# Patient Record
Sex: MALE
[De-identification: names, ages, dates, MRNs, and addresses within clinical notes are randomized per-mention and may not be internally consistent; named-entity substitution may affect disease eponyms.]

---

## 2021-03-01 ENCOUNTER — HOSPITAL ENCOUNTER (OUTPATIENT)
Dept: HOSPITAL 82 - ED | Age: 61
Setting detail: OBSERVATION
LOS: 3 days | Discharge: HOME | DRG: 177 | End: 2021-03-04
Attending: INTERNAL MEDICINE | Admitting: INTERNAL MEDICINE
Payer: COMMERCIAL

## 2021-03-01 VITALS — HEIGHT: 67 IN | WEIGHT: 209.25 LBS | BODY MASS INDEX: 32.84 KG/M2

## 2021-03-01 VITALS — SYSTOLIC BLOOD PRESSURE: 116 MMHG | DIASTOLIC BLOOD PRESSURE: 78 MMHG

## 2021-03-01 VITALS — DIASTOLIC BLOOD PRESSURE: 85 MMHG | SYSTOLIC BLOOD PRESSURE: 131 MMHG

## 2021-03-01 DIAGNOSIS — Z95.5: ICD-10-CM

## 2021-03-01 DIAGNOSIS — K21.9: ICD-10-CM

## 2021-03-01 DIAGNOSIS — E78.5: ICD-10-CM

## 2021-03-01 DIAGNOSIS — I10: ICD-10-CM

## 2021-03-01 DIAGNOSIS — R09.02: ICD-10-CM

## 2021-03-01 DIAGNOSIS — I25.10: ICD-10-CM

## 2021-03-01 DIAGNOSIS — U07.1: Primary | ICD-10-CM

## 2021-03-01 DIAGNOSIS — J12.82: ICD-10-CM

## 2021-03-01 LAB
ALBUMIN SERPL-MCNC: 4 G/DL (ref 3.2–5)
ALP SERPL-CCNC: 91 U/L (ref 38–126)
ANION GAP SERPL CALCULATED.3IONS-SCNC: 15 MMOL/L
AST SERPL-CCNC: 49 U/L (ref 19–48)
BASOPHILS NFR BLD AUTO: 1 % (ref 0–3)
BUN SERPL-MCNC: 19 MG/DL (ref 8–23)
BUN/CREAT SERPL: 26
CHLORIDE SERPL-SCNC: 95 MMOL/L (ref 95–108)
CO2 SERPL-SCNC: 25 MMOL/L (ref 22–30)
CREAT SERPL-MCNC: 0.8 MG/DL (ref 0.7–1.3)
EOSINOPHIL NFR BLD AUTO: 2 % (ref 0–8)
ERYTHROCYTE [DISTWIDTH] IN BLOOD BY AUTOMATED COUNT: 12.2 % (ref 11.5–15.5)
HCT VFR BLD AUTO: 47.3 % (ref 39–50)
HGB BLD-MCNC: 16.1 G/DL (ref 14–18)
IMM GRANULOCYTES NFR BLD: 4.1 % (ref 0–5)
LYMPHOCYTES NFR BLD: 14 % (ref 15–41)
MCH RBC QN AUTO: 30.1 PG  CALC (ref 26–32)
MCHC RBC AUTO-ENTMCNC: 34 G/DL CAL (ref 32–36)
MCV RBC AUTO: 88.4 FL  CALC (ref 80–100)
MONOCYTES NFR BLD AUTO: 10 % (ref 2–13)
NEUTROPHILS # BLD AUTO: 7.62 THOU/UL (ref 1.82–7.42)
NEUTROPHILS NFR BLD AUTO: 69 % (ref 42–76)
PLATELET # BLD AUTO: 404 THOU/UL (ref 130–400)
POTASSIUM SERPL-SCNC: 3.8 MMOL/L (ref 3.5–5.1)
PROT SERPL-MCNC: 8 G/DL (ref 6.3–8.2)
RBC # BLD AUTO: 5.35 MILL/UL (ref 4.7–6.1)
SODIUM SERPL-SCNC: 131 MMOL/L (ref 137–146)

## 2021-03-01 PROCEDURE — G0378 HOSPITAL OBSERVATION PER HR: HCPCS

## 2021-03-01 NOTE — NUR
PT ARRIVED TO [facility] FLOOR ROOM #289 VIA STRETCHER IN STABLE CONDITION
ACCOMPANIED BY NURSEJENNIFER;VS AND ASSESSMENT WERE COMPLETED;VS WITHIN
NORMAL LIMITS;ALLERGY BAND PLACED ON PT;HEART SOUNDS REGULAR IN RATE AND
RHYTHM;LUNG SOUNDS WERE CLEAR THROUGHOUT AND DIMINSHED IN LOWER
LOBES;RESPIRATIONS WERE EVEN AND UNLABORED ON RA;NON-PRODUCTIVE COUGH NOTED;PT
HAS NO COMPLAINTS OF PAIN OTHER THAN WHEN HE COUGHS, HIS CHEST AND HEAD WILL
HURT;NO EDEMA PRESENT;SKIN IS INTACT;TELE IN PLACE;#20G IV IN LAC IS RUNNING
NS @100 ML/HR;IV SITE IS PATENT AND FREE OF COMPLICATIONS;SAFETY PRECAUTIONS
IN PLACE;CALL LIGHT WITHIN REACH;BED IN LOWEST POSITION;WILL CONTINUE TO
MONITOR.

## 2021-03-01 NOTE — NUR
PATIENT RESTING IN BED AT THIS TIME AWAKE ALERT AND ORIENTEDX3. PATIENT ON
ISOLATION IN NEG PRESSURE ROOM FOR COVID. IVF NS PATENT AND INFUSING VIA LAC
SITE AT 100CC/HR. SITE IS HEALTHY AT THIS TIME. TELE MONITOR IN PLACE. O2 VIA
NASAL CANNULA APPLIED. O2 SAT ON RA IS 92%. LUNGS ARE CLLEAR AND DIMINISHED IN
BASES. ENCOURAGED U SE OF IS Q1H WHILE AWAKE IN REPS OF 10. ENCOURAGED PRONING
IF POSSIBLE. PATIENT STATES LAST BM WAS TODAY-NO DIARRHEA. DENIES ANY
DIFFICULTY WITH URINATION. MEDICATED FOR DRY COUGH WITH ROBITUSSIN AC 10CC AS
ORDERED. SAFETY PRECAUTIONS REINFORCED. CALL LIGHT IN REACH. WILL CONT TO
MONITOR.

## 2021-03-02 VITALS — DIASTOLIC BLOOD PRESSURE: 89 MMHG | SYSTOLIC BLOOD PRESSURE: 137 MMHG

## 2021-03-02 VITALS — DIASTOLIC BLOOD PRESSURE: 86 MMHG | SYSTOLIC BLOOD PRESSURE: 154 MMHG

## 2021-03-02 VITALS — SYSTOLIC BLOOD PRESSURE: 119 MMHG | DIASTOLIC BLOOD PRESSURE: 77 MMHG

## 2021-03-02 VITALS — SYSTOLIC BLOOD PRESSURE: 123 MMHG | DIASTOLIC BLOOD PRESSURE: 74 MMHG

## 2021-03-02 VITALS — SYSTOLIC BLOOD PRESSURE: 142 MMHG | DIASTOLIC BLOOD PRESSURE: 88 MMHG

## 2021-03-02 VITALS — DIASTOLIC BLOOD PRESSURE: 81 MMHG | SYSTOLIC BLOOD PRESSURE: 147 MMHG

## 2021-03-02 LAB
ALBUMIN SERPL-MCNC: 3.4 G/DL (ref 3.2–5)
ALP SERPL-CCNC: 70 U/L (ref 38–126)
ANION GAP SERPL CALCULATED.3IONS-SCNC: 15 MMOL/L
AST SERPL-CCNC: 33 U/L (ref 19–48)
BASOPHILS NFR BLD AUTO: 1 % (ref 0–3)
BUN SERPL-MCNC: 21 MG/DL (ref 8–23)
BUN/CREAT SERPL: 33
CHLORIDE SERPL-SCNC: 102 MMOL/L (ref 95–108)
CO2 SERPL-SCNC: 23 MMOL/L (ref 22–30)
CREAT SERPL-MCNC: 0.6 MG/DL (ref 0.7–1.3)
CRP SERPL-MCNC: 6.8 MG/DL (ref 0–0.9)
EOSINOPHIL NFR BLD AUTO: 0 % (ref 0–8)
ERYTHROCYTE [DISTWIDTH] IN BLOOD BY AUTOMATED COUNT: 12.2 % (ref 11.5–15.5)
HCT VFR BLD AUTO: 47.5 % (ref 39–50)
HGB BLD-MCNC: 15.6 G/DL (ref 14–18)
IMM GRANULOCYTES NFR BLD: 3.7 % (ref 0–5)
LYMPHOCYTES NFR BLD: 14 % (ref 15–41)
MCH RBC QN AUTO: 29.8 PG  CALC (ref 26–32)
MCHC RBC AUTO-ENTMCNC: 32.8 G/DL CAL (ref 32–36)
MCV RBC AUTO: 90.8 FL  CALC (ref 80–100)
MONOCYTES NFR BLD AUTO: 4 % (ref 2–13)
NEUTROPHILS # BLD AUTO: 4.48 THOU/UL (ref 1.82–7.42)
NEUTROPHILS NFR BLD AUTO: 78 % (ref 42–76)
PLATELET # BLD AUTO: 334 THOU/UL (ref 130–400)
POTASSIUM SERPL-SCNC: 5.6 MMOL/L (ref 3.5–5.1)
PROT SERPL-MCNC: 6.9 G/DL (ref 6.3–8.2)
RBC # BLD AUTO: 5.23 MILL/UL (ref 4.7–6.1)
SODIUM SERPL-SCNC: 134 MMOL/L (ref 137–146)

## 2021-03-02 NOTE — NUR
PATIENT SITTING UP IN THE RECLINER WITH PERSISTANT DRY HACKING COUGH. O2 SATS
ARE IN THE MID-HIGH 80'S. O2 REAPPLIED VIA NASAL CANNULA AT 3LPM. RESPONDED
WELL TO LOW NINETY'S. MEDICATED WITH ROBITUSSIN 10CC AS ORDERED. SAFETY
PRECAUTIONS REINFORCED. CALL LIGHT IN REACH. WILL CONT TO MONITOR.

## 2021-03-02 NOTE — NUR
PATIENT RESTING IN BED WATCHING TV WITH O2 VIA NASAL CANNULA IN PLACE AT 2LPM.
O2 SAT IS 95% AT THIS TIME. AWAKE ALERT AND ORIENTEDX3. PATIENT ON ISOLATION
IN NEG PRESSURE ROOM FOR COVID. TELE MONITOR IN PLACE. PATIENT ENCOURAGED USE
OF IS Q1H W/A IN REPS OF 10 AND ABLE TO DEMONSTRATE PROPER USE OF THE DEVICE.
LUNGS ARE DIMINISHED BUT CLEAR. NON-PRODUCTIVE COUGH REMAINS-MEDICATED WITH
ROBITUSSIN AC 10CC AS ORDERED FOR COUGH. ENCOURAGED PRONING IF POSSIBLE. IV
SITE TO LAC INTACT WITH IVF NS PATENT AND INFUSING AT KVO RATE. SITE REMAINS
HEALTHY. SAFETY PRECAUTIONS REINFORCED. CALL LIGHT IN REACH. WILL CONT TO
MONITOR.

## 2021-03-02 NOTE — NUR
PT WAS FOUND SITTING IN BED EATING LUNCH;PT HAS NO REPORTS OF PAIN AT THIS
TIME;O2 VIA NC @2L IS IN PLACE;TELE IS IN PLACE;#20G IV IN LAC IS RUNNING NS
@10ML/HR;IV SITE IS FREE OF COMPLICATIONS;SAFETY PRECAUTIONS IN PLACE;CALL
LIGHT WIHIN REACH; BED IN LOWEST POSITION;WILL CONTINUE TO MONITOR.

## 2021-03-02 NOTE — NUR
PATIENT RESTING IN BED AT THIS TIME WITH O2 VIA NASAL CANNULA IN PLACE. O2 SAT
IS NOW 97% WITH O2 ON. TELE IS SR-83 AT THIS TIME. IVF NS PATENT AND INFUSING
VIA LAC SITE AT 100CC/HR. STILL WITH NON-PRODUCTIVE EVEN AFTER ROBITUSSIN AC
GIVEN. SAFETY PRECAUTIONS REINFORCED. CALL LIGHT IN REACH. WILL CONT TO
MONITOR,.

## 2021-03-02 NOTE — NUR
PATIENT RESTING IN BED-CONT TO HAVE DRY HACKING COUGH-MEDICATED WITH
ROBITUSSIN AC 10CC FOR COUGH. O2 REMAINS IN PLACE AT 2LPM. IVF PATENT AND
INFUSING VIA LEFT AC SITE AT 100CC/HR. TELE MONITOR IN PLACE. INSTRUCTED ON U
SE OF IS Q1H WHILE AWAKE IN REPS OF 10-ABLE TO DEMONSTRATE PROPER USE OF THE
DEVICE. SAFETY PRECAUTIONS REINFORCED. CALL LIGHT IN REACH. WILL CONT TO
MONITOR.

## 2021-03-02 NOTE — NUR
PT WAS FOUND RESTING IN BED IN SEMI-FOWLERS POSITION;PT IS A&OX3;VS AND
ASSESSMENT WERE COMPLETED;VS WERE WITHIN NORMAL LIMITS WITH THE EXCEPTION OF
;MORNING MEDICATIONS WERE GIVEN AT THIS TIME;PT REPORTS NO PAIN;HEART
SOUNDS ARE REGULAR IN RATE AND RHYTHM;LUNG SOUNDS ARE CLEAR AND DIMINSHED IN
LOWER LOBES;RESPIRATIONS ARE EVEN AND UNLABORED ON 2L O2 VIA NC WITH SAT OF
96%;PT HAS A NON-PRODUCTIVE COUGH NOTED;TELE IS IN PLACE READING SR
@65BPM;#20G IV IN LAC RUNNING NS @100ML/HR;IV SITE IS FREE OF
COMPLICATIONS;SAFETY PRECAUTIONS IN PLACE;CALL LIGHT WITHIN REACH;BED IN
LOWEST POSITON;WILL CONTINUE TO MONITOR.

## 2021-03-02 NOTE — NUR
PT WAS FOUND RESTING IN BED IN SEMI-FOWLERS POSITION;PT REPORTS NO PAIN;TELE
IS IN PLACE;O2 VIA NC @2L IS IN PLACE;#20G IV IN LAC IS RUNNING NS@10ML/HR;IV
SITE IS FREE OF COMPLICATIONS;SAFETY PRECAUTIONS IN PLACE;CALL LIGHT WITHIN
REACH;BED IN LOWEST POSITION;WILL CONTINUE TO MONITOR.

## 2021-03-03 VITALS — DIASTOLIC BLOOD PRESSURE: 88 MMHG | SYSTOLIC BLOOD PRESSURE: 152 MMHG

## 2021-03-03 VITALS — SYSTOLIC BLOOD PRESSURE: 154 MMHG | DIASTOLIC BLOOD PRESSURE: 90 MMHG

## 2021-03-03 VITALS — SYSTOLIC BLOOD PRESSURE: 146 MMHG | DIASTOLIC BLOOD PRESSURE: 87 MMHG

## 2021-03-03 VITALS — DIASTOLIC BLOOD PRESSURE: 82 MMHG | SYSTOLIC BLOOD PRESSURE: 154 MMHG

## 2021-03-03 VITALS — SYSTOLIC BLOOD PRESSURE: 154 MMHG | DIASTOLIC BLOOD PRESSURE: 85 MMHG

## 2021-03-03 VITALS — SYSTOLIC BLOOD PRESSURE: 156 MMHG | DIASTOLIC BLOOD PRESSURE: 83 MMHG

## 2021-03-03 LAB
ALBUMIN SERPL-MCNC: 3.2 G/DL (ref 3.2–5)
ALP SERPL-CCNC: 66 U/L (ref 38–126)
ANION GAP SERPL CALCULATED.3IONS-SCNC: 14 MMOL/L
AST SERPL-CCNC: 24 U/L (ref 19–48)
BASOPHILS NFR BLD AUTO: 0 % (ref 0–3)
BUN SERPL-MCNC: 19 MG/DL (ref 8–23)
BUN/CREAT SERPL: 30
CHLORIDE SERPL-SCNC: 102 MMOL/L (ref 95–108)
CO2 SERPL-SCNC: 26 MMOL/L (ref 22–30)
CREAT SERPL-MCNC: 0.6 MG/DL (ref 0.7–1.3)
CRP SERPL-MCNC: 3.8 MG/DL (ref 0–0.9)
EOSINOPHIL NFR BLD AUTO: 0 % (ref 0–8)
ERYTHROCYTE [DISTWIDTH] IN BLOOD BY AUTOMATED COUNT: 12.1 % (ref 11.5–15.5)
HCT VFR BLD AUTO: 42.6 % (ref 39–50)
HGB BLD-MCNC: 14.2 G/DL (ref 14–18)
IMM GRANULOCYTES NFR BLD: 1.5 % (ref 0–5)
LYMPHOCYTES NFR BLD: 7 % (ref 15–41)
MCH RBC QN AUTO: 30.1 PG  CALC (ref 26–32)
MCHC RBC AUTO-ENTMCNC: 33.3 G/DL CAL (ref 32–36)
MCV RBC AUTO: 90.4 FL  CALC (ref 80–100)
MONOCYTES NFR BLD AUTO: 7 % (ref 2–13)
NEUTROPHILS # BLD AUTO: 11.45 THOU/UL (ref 1.82–7.42)
NEUTROPHILS NFR BLD AUTO: 84 % (ref 42–76)
PLATELET # BLD AUTO: 403 THOU/UL (ref 130–400)
POTASSIUM SERPL-SCNC: 5.3 MMOL/L (ref 3.5–5.1)
PROT SERPL-MCNC: 6.6 G/DL (ref 6.3–8.2)
RBC # BLD AUTO: 4.71 MILL/UL (ref 4.7–6.1)
SODIUM SERPL-SCNC: 137 MMOL/L (ref 137–146)

## 2021-03-03 NOTE — NUR
PATIENT RESTING IN BED WITH O2 VIA NASAL CANNULA IN PLACE AT 2LPM. EYES ARE
CLOSED AND RESPS ARE EVEN AND UNLABORED. TELE MONITOR IN PLACE. IVF PATENT AND
INFUSING AT KVO RATE VIA LAC SITE. LESS COUGHING TONIGHT COMPARED TO LAST
NIGHT. CALL LIGHT IN REACH. WILL CONT TO MONITOR.

## 2021-03-03 NOTE — NUR
PT RESTING IN SEMI FOWLERS POSITION,A&O X3;PT DENIES ANY CURRENT PAIN OR
DISCOMFORTS,PAIN SCALE AND REPORTING EDUCATED;RESPIRATIONS EVEN AND UNLABORED
ON O2 @ 2L VIA NC-PT IS NOT OXYGEN DEPENDENT;CLEAR/DIMINISHED LUNG SOUNDS
NOTED WITH NON-PRODUCTIVE COUGH;ABDOMAN SOFT ON PALPATION AND ACTIVE IN ALL 4
QUADRANTS;STRONG PEDAL PULSES;SKIN INTACT;TELE MONITORING IN PLACE;#20G TO LAC
INFUSING NS @ 10ML/HR,SITE APPEARS HEALTHY;PT REMAINS IN AIR/CONTACT
PRECAUTIONS DUE TO COVID19 DX;PT DENIES ANY ADDITIONAL NEEDS AT THIS
TIME;ENCOURAGED TO CALL FOR ASSISTANCE IF NEEDED;FALL PRECAUTIONS IN PLACE
WITH BED IN THE LWOEST POSITION AND CALL LIGHT IN REACH;WILL CONTINUE TO
MONITOR

## 2021-03-03 NOTE — NUR
PT RESTING IN SEMI FOWLERS POSITION;RESPIRATIONS EVEN AND UNLABORED ON O2 @ 1L
VIA NC, OXYGEN WAS TITRATED DOWN BY MD ON ROUNDS;O2 REMOVED AT THIS TIME;PT
DENIES ANY CURRENT PAIN OR DISCOMFORTS;PT REQUEST PRN COUGH MEDICATION, PT TO
BE MEDICATED WITH PRN ROBITUSSIN AC PER REQUEST;TELE MONITORING IN PLACE;NS
INFUSING WITH EASE TO LAC PER ORDER;PT DENIES ANY ADDITIONAL NEEDS AND IS
ENCOURAGED TO CALL FOR ASSISTANCE IF NEEDED;CALL LIGHT IN REACH;WILL CONTINUE
TO MONITOR

## 2021-03-03 NOTE — NUR
PT RESTING IN SEMI FOWLERS POSITION WATCHING TV;RESPIRATIONS EVEN AND
UNLABORED ON RA;PT DENIES ANY CURRENT PAIN OR DISCOMFORTS;TELE MONITORING IN
PLACE;IV SITE TO LAC PATENT INFUSING NS WITH EASE PER ORDER;PT DENIES ANY
ADDITIONAL NEEDS AT THIS TIME;ENCOURAGED TO CALL FOR ASSISTANCE IF NEEDED;CALL
LIGHT IN REACH;WILL CONTINUE TO MONITOR

## 2021-03-03 NOTE — NUR
PATIENT RESTING IN BED WITH O2 VIA NASAL CANNULA IN PLACE AT 2LPM. O2 SAT IS
95%. EYES ARE CLOSED. RESPS ARE EVEN AND UNLABORED. TELE MONITOR IN PLACE. IVF
PATENT AND INFUSING VIA LAC SITE AT KVO RATE. CALL LIGHT IN REACH. WILL CONT
TO MONITOR.

## 2021-03-04 VITALS — DIASTOLIC BLOOD PRESSURE: 91 MMHG | SYSTOLIC BLOOD PRESSURE: 151 MMHG

## 2021-03-04 VITALS — DIASTOLIC BLOOD PRESSURE: 86 MMHG | SYSTOLIC BLOOD PRESSURE: 164 MMHG

## 2021-03-04 VITALS — DIASTOLIC BLOOD PRESSURE: 92 MMHG | SYSTOLIC BLOOD PRESSURE: 174 MMHG

## 2021-03-04 VITALS — SYSTOLIC BLOOD PRESSURE: 168 MMHG | DIASTOLIC BLOOD PRESSURE: 93 MMHG

## 2021-03-04 LAB
ANION GAP SERPL CALCULATED.3IONS-SCNC: 12 MMOL/L
BUN SERPL-MCNC: 21 MG/DL (ref 8–23)
BUN/CREAT SERPL: 32
CHLORIDE SERPL-SCNC: 101 MMOL/L (ref 95–108)
CO2 SERPL-SCNC: 26 MMOL/L (ref 22–30)
CREAT SERPL-MCNC: 0.6 MG/DL (ref 0.7–1.3)
ERYTHROCYTE [DISTWIDTH] IN BLOOD BY AUTOMATED COUNT: 12 % (ref 11.5–15.5)
HCT VFR BLD AUTO: 43.7 % (ref 39–50)
HGB BLD-MCNC: 14.7 G/DL (ref 14–18)
MAGNESIUM SERPL-MCNC: 2.1 MG/DL (ref 1.6–2.3)
MCH RBC QN AUTO: 30.1 PG  CALC (ref 26–32)
MCHC RBC AUTO-ENTMCNC: 33.6 G/DL CAL (ref 32–36)
MCV RBC AUTO: 89.5 FL  CALC (ref 80–100)
PLATELET # BLD AUTO: 383 THOU/UL (ref 130–400)
POTASSIUM SERPL-SCNC: 4.6 MMOL/L (ref 3.5–5.1)
RBC # BLD AUTO: 4.88 MILL/UL (ref 4.7–6.1)
SODIUM SERPL-SCNC: 135 MMOL/L (ref 137–146)

## 2021-03-04 NOTE — NUR
PT RESTING IN SEMI FOWLERS POSITION;RESPIRATIONS REMAIN EVEN AND UNLABORED ON
RA;OXYGEN QUALIFICATION TEST COMPLETED AT THIS TIME PER MD; PT O2 SATS DROPPED
TO 95% ON RA, PT TOLERATED WELL;PT RE-POSITIONED BACK INTO BED;PT DENIES ANY
CURRENT PAIN OR DISCOMFORTS;TELE MONITORING IN PLACE;#20G TO LAC CONTINUES TO
INFUSE NS WITH EASE;PT EDUCATED ON PLAN TO DISCHARGE HOME THIS AFTERNOON AND
VERBALIZES UNDERSTANDING;PT DENIES ANY ADDITIONAL NEEDS AT THIS
TIME;ENCOURAGED TO CALL FOR ASSISTANCE IF NEEDED;CALL LIGHT IN REACH;WILL
CONTINUE TO MONITOR

## 2021-03-04 NOTE — NUR
Discharge instructions given. Patient verbalizes understanding of same.
Discharged in stable condition via Wheelchair to Home with
spouse. All belongings sent with pt.
PT TRANSPORTED TO [facility] IN STABLE CONDITION VIA WHEELCHAIR ACCOMPANIED BY
EFE ZALDIVAR.ALL BELONINGS LEFT WITH PT INCLUDING RX FOR ROBITUSSIN AC;SPOUSE TO
TRANSPORT PT HOME.

## 2021-03-04 NOTE — NUR
PT LAYING IN BED WITH EYES CLOSED, APPEARS TO BE SLEEPING, APPEARS COMFORTABLE
AND IN NO DISTRESS. RESPIRATIONS REGULAR AND UNLABORED. ITEMS REMAIN WITHIN
REACH, CALL BELL REMAINS WITHIN REACH. BED REMAINS LOCKED AND IN LOW POSITION
WITH BEDRAILS UP X2. WILL CONTINUE TO MONITOR.

## 2021-03-04 NOTE — NUR
PT RESTING IN SEMI FOWLERS POSITION,A&O X3;VS OBTAINED AND ASSESSMENT
COMPLETED;PT DENIES ANY CURRENT PAIN OR DISCOMFORTS,PAIN SCALE AND REPORTING
EDUCATED;RESPIRATIONS EVEN AND UNLABORED ON RA,CLEAR/DIMINISHED LUNG SOUNDS
NOTED;NON-PRODUCTIVE COUGH,PT MEDICATED WITH PRN ROBITUSSIN AC AT THIS
TIME;ABDOMEN SOFT ON PALPATION AND ACTIVE IN ALL 4 QUADRANTS;STRONG PEDAL
PULSES;SKIN INTACT;TELE MONITORING IN PLACE;#20G TO LAC INFUSING NS @
10ML/HR,SITE APPEARS HEALTHY;PT REMAINS IN AIR/CONTACT PRECAUTIONS DUE TO
COVID19 DX;PT DENIES ANY ADDITIONAL NEEDS AT THIS TIME;ENCOURAGED TO CALL FOR
ASSISTANCE IF NEEDED;FALL PRECAUTIONS IN PLACE WITH BED IN THE LOWEST POSITION
AND CALL LIGHT IN REACH;WILL CONTINUE TO MONITOR

## 2021-03-04 NOTE — NUR
ALL DISCHARGE INSTRUCTIONS PROVIDED AT THIS TIME;PT INSRUCTED TO F/U WITH PCP
IN THE NEXT WEEK, TAKE ORAL ABX FOR 5 MORE DAYS AND STEROID FOR 8 MORE DAYS,
RX FOR TESSLON SENT TO [facility] AND PT EDUCATED ON TAKING THEM FIRST FOR COUGH, IF
INEFFECTIVE RX PROVIDED FOR ROBITUSSIN AC;RETURN TO ER IF NEW OR WORSE
SYMPTOMS;PT VERBALIZES UNDERSTANDING AND DENIES ANY ADDITIONAL QUESTIONS OR
NEEDS;IV SITE REMOVED WITH CATHETER INTACT AND TELE D/C;WHEELCHAIR TO BE
PROVIDED FOR D/C HOME;SPOUSE TO TRANSPORT PT HOME;WILL CONTINUE TO MONITOR

## 2022-12-16 ENCOUNTER — HOSPITAL ENCOUNTER (EMERGENCY)
Dept: HOSPITAL 82 - ED | Age: 62
Discharge: HOME | End: 2022-12-16
Payer: MEDICARE

## 2022-12-16 VITALS — DIASTOLIC BLOOD PRESSURE: 74 MMHG | SYSTOLIC BLOOD PRESSURE: 131 MMHG

## 2022-12-16 VITALS — BODY MASS INDEX: 32.15 KG/M2 | WEIGHT: 204.81 LBS | HEIGHT: 67 IN

## 2022-12-16 DIAGNOSIS — Z95.5: ICD-10-CM

## 2022-12-16 DIAGNOSIS — I10: ICD-10-CM

## 2022-12-16 DIAGNOSIS — E78.5: ICD-10-CM

## 2022-12-16 DIAGNOSIS — R10.31: Primary | ICD-10-CM

## 2022-12-16 LAB
ALBUMIN SERPL-MCNC: 4.4 G/DL (ref 3.2–5)
ALP SERPL-CCNC: 123 U/L (ref 38–126)
ANION GAP SERPL CALCULATED.3IONS-SCNC: 12 MMOL/L
AST SERPL-CCNC: 30 U/L (ref 19–48)
BASOPHILS NFR BLD AUTO: 0 % (ref 0–3)
BILIRUB UR QL STRIP.AUTO: NEGATIVE
BUN SERPL-MCNC: 21 MG/DL (ref 8–23)
BUN/CREAT SERPL: 25
CHLORIDE SERPL-SCNC: 99 MMOL/L (ref 95–108)
CO2 SERPL-SCNC: 30 MMOL/L (ref 22–30)
COLOR UR AUTO: YELLOW
CREAT SERPL-MCNC: 0.8 MG/DL (ref 0.7–1.3)
EOSINOPHIL NFR BLD AUTO: 2 % (ref 0–8)
ERYTHROCYTE [DISTWIDTH] IN BLOOD BY AUTOMATED COUNT: 13 % (ref 11.5–15.5)
GLUCOSE UR STRIP.AUTO-MCNC: NEGATIVE MG/DL
HCT VFR BLD AUTO: 41.5 % (ref 39–50)
HGB BLD-MCNC: 14 G/DL (ref 14–18)
HGB UR QL STRIP.AUTO: NEGATIVE
IMM GRANULOCYTES NFR BLD: 0.1 % (ref 0–5)
KETONES UR STRIP.AUTO-MCNC: NEGATIVE MG/DL
LEUKOCYTE ESTERASE UR QL STRIP.AUTO: NEGATIVE
LIPASE SERPL-CCNC: 282 U/L (ref 23–300)
LYMPHOCYTES NFR BLD: 26 % (ref 15–41)
MCH RBC QN AUTO: 30 PG  CALC (ref 26–32)
MCHC RBC AUTO-ENTMCNC: 33.7 G/DL CAL (ref 32–36)
MCV RBC AUTO: 89.1 FL  CALC (ref 80–100)
MONOCYTES NFR BLD AUTO: 9 % (ref 2–13)
NEUTROPHILS # BLD AUTO: 4.88 THOU/UL (ref 1.82–7.42)
NEUTROPHILS NFR BLD AUTO: 62 % (ref 42–76)
NITRITE UR QL STRIP.AUTO: NEGATIVE
PH UR STRIP.AUTO: 6 [PH] (ref 4.5–8)
PLATELET # BLD AUTO: 254 THOU/UL (ref 130–400)
POTASSIUM SERPL-SCNC: 4.1 MMOL/L (ref 3.5–5.1)
PROT SERPL-MCNC: 7.8 G/DL (ref 6.3–8.2)
PROT UR QL STRIP.AUTO: NEGATIVE MG/DL
RBC # BLD AUTO: 4.66 MILL/UL (ref 4.7–6.1)
SODIUM SERPL-SCNC: 137 MMOL/L (ref 137–146)
SP GR UR STRIP.AUTO: 1.01
UROBILINOGEN UR QL STRIP.AUTO: 0.2 E.U./DL